# Patient Record
Sex: MALE | Race: WHITE
[De-identification: names, ages, dates, MRNs, and addresses within clinical notes are randomized per-mention and may not be internally consistent; named-entity substitution may affect disease eponyms.]

---

## 2019-04-10 ENCOUNTER — HOSPITAL ENCOUNTER (EMERGENCY)
Dept: HOSPITAL 10 - FTE | Age: 59
Discharge: HOME | End: 2019-04-10
Payer: COMMERCIAL

## 2019-04-10 ENCOUNTER — HOSPITAL ENCOUNTER (EMERGENCY)
Dept: HOSPITAL 91 - FTE | Age: 59
Discharge: HOME | End: 2019-04-10
Payer: COMMERCIAL

## 2019-04-10 VITALS
HEIGHT: 70 IN | WEIGHT: 216.49 LBS | HEIGHT: 70 IN | WEIGHT: 216.49 LBS | BODY MASS INDEX: 30.99 KG/M2 | BODY MASS INDEX: 30.99 KG/M2

## 2019-04-10 VITALS — SYSTOLIC BLOOD PRESSURE: 180 MMHG | RESPIRATION RATE: 18 BRPM | DIASTOLIC BLOOD PRESSURE: 108 MMHG | HEART RATE: 97 BPM

## 2019-04-10 DIAGNOSIS — L02.413: Primary | ICD-10-CM

## 2019-04-10 PROCEDURE — 96372 THER/PROPH/DIAG INJ SC/IM: CPT

## 2019-04-10 PROCEDURE — 99284 EMERGENCY DEPT VISIT MOD MDM: CPT

## 2019-04-10 RX ADMIN — ACETAMINOPHEN 1 MG: 500 TABLET, FILM COATED ORAL at 19:45

## 2019-04-10 RX ADMIN — CEFTRIAXONE SODIUM 1 GM: 1 INJECTION, POWDER, FOR SOLUTION INTRAMUSCULAR; INTRAVENOUS at 19:45

## 2019-04-10 RX ADMIN — IBUPROFEN 1 MG: 800 TABLET, FILM COATED ORAL at 19:45

## 2019-04-10 RX ADMIN — LIDOCAINE HYDROCHLORIDE 1 ML: 10 INJECTION, SOLUTION EPIDURAL; INFILTRATION; INTRACAUDAL; PERINEURAL at 20:02

## 2019-04-10 RX ADMIN — SULFAMETHOXAZOLE AND TRIMETHOPRIM 1 TAB: 800; 160 TABLET ORAL at 20:02

## 2019-04-10 NOTE — ERD
ER Documentation


Chief Complaint


Chief Complaint





abscess @ right upper arm





HPI


History of Present Illness: Patient coming in today with complaint of abscess to


right upper forearm that has been present for 4 days.  Patient reports he feels 


that abscess is getting better due to redness and induration decreasing.  Nichole willoughby reports he is only coming to ER because his wife insisted that he come in. 


Patient reports he wants to go to work tonight.  Patient denies associated 


symptoms including chills, fatigue.  Patient unsure if he has been bitten by an 


insect, but reports mild itching 4 nights ago and then noticing redness and 


swelling the next day.





At home pharmacological/nonpharmacological treatment for symptoms: Patient 


reports washing abscess at nighttime with alcohol and putting Neosporin on it. 


"It keeps she is scabbing over"








Denies social concerns; Denies recent foreign travel





ROS


All systems reviewed and are negative except as per history of present illness.





Medications


Home Meds


Active Scripts


Acetaminophen* (Tylophen*) 500 Mg Capsule, 2 CAP PO Q6 PRN for PAIN AND OR 


ELEVATED TEMP, #20 CAP


   Prov:MARIN SRINIVASAN NP         4/10/19


Sulfamethoxazole/Trimethoprim* (Bactrim Ds* Tablet) 1 Each Tablet, 1 TAB PO BID 


for abscess infection for 7 Days, TAB


   Prov:MARIN SRINIVASAN NP         4/10/19


Cephalexin* (Keflex*) 500 Mg Capsule, 500 MG PO QID for 7 Days, CAP


   Prov:MARIN SRINIVASAN NP         4/10/19


Ibuprofen* (Motrin*) 800 Mg Tab, 800 MG PO TID, #30 TAB


   Prov:JESSICA PENALOZA MD         8/30/15


Oxycodone Hcl-Acetaminophen* (Percocet*)  Mg Tablet, 1 TAB PO Q4H PRN for 


PAIN, #20 TAB


   Prov:JESSICA PENALOZA MD         8/30/15


Diazepam* (Diazepam*) 10 Mg Tablet, 10 MG PO Q8, #20 TAB


   Prov:JESSICA PENALOZA MD         8/30/15


Reported Medications


Ibuprofen* (Motrin*) 800 Mg Tab, 800 MG PO TID PRN for PAIN LEVEL 1-5, TAB


   8/30/15


Cyclobenzaprine Hcl* (Cyclobenzaprine Hcl*) 10 Mg Tablet, 10 MG PO TID PRN for M


USCLE SPASMS, TAB


   8/30/15





Allergies


Allergies:  


Coded Allergies:  


     No Known Drug Allergies (Verified  Allergy, Unknown, 4/10/19)





PMhx/Soc


Medical and Surgical Hx:  pt denies Medical Hx, pt denies Surgical Hx


Hx Miscellaneous Medical Probl:  Yes (SCIATICA )


Hx Alcohol Use:  Yes


Hx Substance Use:  No


Hx Tobacco Use:  No


Smoking Status:  Never smoker





FmHx


Family History:  diabetes; 


   No coronary disease





Physical Exam


Vitals





Vital Signs


  Date      Temp   Pulse  Resp  B/P (MAP)   Pulse Ox  O2         O2 Flow    FiO2


Time                                                  Delivery   Rate


   4/10/19   99.4     97    18     180/108        97  Room Air


     20:57                           (132)


   4/10/19  100.1    114    24     156/101        96


     18:21                           (119)





Physical Exam


Const:   No acute distress


Head:   Atraumatic 


Eyes:    Normal Conjunctiva


ENT:    Normal External Ears, Nose and Mouth.


Neck:               Full range of motion. No meningismus.


Resp:   Clear to auscultation bilaterally


Cardio:   Regular rate and rhythm, no murmurs


Abd:    Soft, non tender, non distended. Normal bowel sounds


Skin:   No petechiae.  Abscess noted to right upper arm, positive induration, no


fluctuance, mild area of purulent drainage noted, no streaking, no 


lymphadenopathy


Back:   No midline or flank tenderness


Ext:    No cyanosis, or edema


Neur:   Awake and alert


Psych:    Normal Mood and Affect


Results 24 hrs





Current Medications


 Medications
   Dose
          Sig/Clarissa
       Start Time
   Status  Last


 (Trade)       Ordered        Route
 PRN     Stop Time              Admin
Dose


                              Reason                                Admin


                1,000 mg       ONCE  STAT
    4/10/19       DC           4/10/19


Acetaminophen                 PO
            19:09
                       19:45




  (Tylenol                                  4/10/19 19:12


Tab)


 Ibuprofen
     800 mg         ONCE  ONCE
    4/10/19       DC           4/10/19


(Motrin)                      PO
            19:30
                       19:45



                                             4/10/19 19:31


 Ceftriaxone    1 gm           ONCE  ONCE
    4/10/19       DC           4/10/19


Sodium
                       IM
            19:30
                       19:45



(Rocephin)                                   4/10/19 19:31


 Lidocaine
     2.1 ml         ONCE  ONCE
    4/10/19       DC           4/10/19


(Xylocaine                    INFIL
         19:30
                       20:02



1%
  (Mpf))                                  4/10/19 19:31


                1 tab          ONCE  ONCE
    4/10/19       DC           4/10/19


Trimethoprim/                 PO
            20:00
                       20:02




                                            4/10/19 20:01


Sulfamethoxaz


ole



(Bactrim


(Ds))








Procedures/MDM


ED course includes a thorough examination and history. 


Medications: Acetaminophen and ibuprofen for fever; patient only reporting very 


mild pain; Rocephin antibiotic for infection due to abscess appearing to be 


causing systemic signs of infection.


Imaging: None


Labs: None





Low suspicion for life-threatening medical emergency.  Low suspicion for 


infectious emergency that requires hospitalization lash immediate intervention. 


No fluctuance noted to abscess, no incision and drainage indicated.





Otherwise healthy patient presenting with constellation of symptoms likely 


representing skin abscess as characterized by history, physical exam findings.  


No cultures done, we will empirically treat for MRSA.





ED course: Consultation with ED MD Dr. Rk Dr. to the bedside to reevaluate 


patient, agrees with plan of care, no fluctuance noting an abscess is mildly 


draining.  Small area of fibrin noted.  No  necrosis noted.  Will add Bactrim 


before discharge.





No respiratory distress, otherwise relatively well appearing and nontoxic. Pat


ient educated on diagnoses, prescriptions, follow-up care, return precautions.  


Strict return precautions given for worsening condition; questions answered 


discharge.





Disposition for discharge with followup in 2 days with PCP/clinic for 


reevaluation





Departure


Diagnosis:  


   Primary Impression:  


   Abscess of right upper extremity


Condition:  Stable


Patient Instructions:  Abscess, Antiobiotic Treatment Only


Referrals:  


[facility]


YOU HAVE RECEIVED A MEDICAL SCREENING EXAM AND THE RESULTS INDICATE THAT YOU DO 


NOT HAVE A CONDITION THAT REQUIRES URGENT TREATMENT IN THE EMERGENCY DEPARTMENT.





FURTHER EVALUATION AND TREATMENT OF YOUR CONDITION CAN WAIT UNTIL YOU ARE SEEN 


IN YOUR DOCTORS OFFICE WITHIN THE NEXT 1-2 DAYS. IT IS YOUR RESPONSIBILITY TO 


MAKE AN APPOINTMENT FOR FOLOW-UP CARE.





IF YOU HAVE A PRIMARY DOCTOR


--you should call your primary doctor and schedule an appointment





IF YOU DO NOT HAVE A PRIMARY DOCTOR YOU CAN CALL OUR PHYSICIAN REFERRAL HOTLINE 


AT


 [phone] 





IF YOU CAN NOT AFFORD TO SEE A PHYSICIAN YOU CAN CHOSE FROM THE FOLLOWING 


[facility]





[facility] [phone] [address]. [facility] [phone] [address]. [facility] [phone] [address] [facility] [phone] [address] [facility]. [facility] [phone] [address] [facility]. [facility] [phone] [address] [facility]. [facility]


YOU HAVE RECEIVED A MEDICAL SCREENING EXAM AND THE RESULTS INDICATE THAT YOU DO 


NOT HAVE A CONDITION THAT REQUIRES URGENT TREATMENT IN THE EMERGENCY DEPARTMENT.





FURTHER EVALUATION AND TREATMENT OF YOUR CONDITION CAN WAIT UNTIL YOU ARE SEEN 


IN YOUR DOCTORS OFFICE WITHIN THE NEXT 1-2 DAYS. IT IS YOUR RESPONSIBILITY TO 


MAKE AN APPOINTMENT FOR FOLOW-UP CARE.





IF YOU HAVE A PRIMARY DOCTOR


--you should call your primary doctor and schedule and appointment





IF YOU DO NOT HAVE A PRIMARY DOCTOR YOU CAN CALL OUR PHYSICIAN REFERRAL HOTLINE 


AT [phone].





IF YOU CAN NOT AFFORD TO SEE A PHYSICIAN YOU CAN CHOSE FROM THE FOLLOWING [facility]


INSTITUTIONS:





[facility]


[address] [place] [address]





[facility]


[address] [place] [address]





[facility]


[address] [place] [address]





Additional Instructions:  


Thank you very much for allowing us to participate in your care. 


Your health and safety is our top priority at [facility].  It 


is important to read all discharge instructions and education provided in your 


discharge packet.





Call your primary care doctor TOMORROW for an appointment during the next 2 days


 for a wound check/reassessment of abscess; bring all the information and 


medications prescribed. 





Have prescriptions filled and follow precisely the directions on the label.  


Cephalexin is an antibiotic; you must take this medication every 6 hours for 7 


days.  Bactrim is an antibiotic; this antibiotic will cover the MRSA bacteria.  


Acetaminophen is for feverpain.





If the symptoms get worse and your provider is unavailable, return to the 


Emergency Department immediately.











MARIN SRINIVASAN NP            Apr 10, 2019 21:25

## 2019-04-12 ENCOUNTER — HOSPITAL ENCOUNTER (EMERGENCY)
Dept: HOSPITAL 91 - FTE | Age: 59
Discharge: HOME | End: 2019-04-12
Payer: COMMERCIAL

## 2019-04-12 ENCOUNTER — HOSPITAL ENCOUNTER (EMERGENCY)
Dept: HOSPITAL 10 - FTE | Age: 59
Discharge: HOME | End: 2019-04-12
Payer: COMMERCIAL

## 2019-04-12 VITALS
WEIGHT: 229.28 LBS | BODY MASS INDEX: 32.82 KG/M2 | WEIGHT: 229.28 LBS | HEIGHT: 70 IN | HEIGHT: 70 IN | BODY MASS INDEX: 32.82 KG/M2

## 2019-04-12 VITALS — HEART RATE: 85 BPM | SYSTOLIC BLOOD PRESSURE: 171 MMHG | RESPIRATION RATE: 18 BRPM | DIASTOLIC BLOOD PRESSURE: 93 MMHG

## 2019-04-12 DIAGNOSIS — Z48.01: Primary | ICD-10-CM

## 2019-04-12 PROCEDURE — 99283 EMERGENCY DEPT VISIT LOW MDM: CPT

## 2019-04-12 NOTE — ERD
ER Documentation


Chief Complaint


Chief Complaint





pt bib self for wound check right upper arm





HPI


59-year-old male presents for recheck of an abscess in the right upper arm.  It 


is draining spontaneously and incision and drainage was not performed.  Is 


taking Bactrim and Keflex.  Patient states pain and redness has improved.  He 


denies fevers, additional symptoms.





ROS


All systems reviewed and are negative except as per history of present illness.





Medications


Home Meds


Active Scripts


Cephalexin* (Keflex*) 500 Mg Capsule, 500 MG PO QID for 5 Days, CAP


   Prov:RAY MARTINEZ MD         4/12/19


Sulfamethoxazole/Trimethoprim* (Bactrim Ds* Tablet) 1 Each Tablet, 1 TAB PO BID 


for 5 Days, #10 TAB


   Prov:RAY MARTINEZ MD         4/12/19


Acetaminophen* (Tylophen*) 500 Mg Capsule, 2 CAP PO Q6 PRN for PAIN AND OR 


ELEVATED TEMP, #20 CAP


   Prov:MARIN SRINIVASAN NP         4/10/19


Sulfamethoxazole/Trimethoprim* (Bactrim Ds* Tablet) 1 Each Tablet, 1 TAB PO BID 


for abscess infection for 7 Days, TAB


   Prov:MARIN SRINIVASAN NP         4/10/19


Cephalexin* (Keflex*) 500 Mg Capsule, 500 MG PO QID for 7 Days, CAP


   Prov:MARIN SRINIVASAN NP         4/10/19


Ibuprofen* (Motrin*) 800 Mg Tab, 800 MG PO TID, #30 TAB


   Prov:JESSICA PENALOZA MD         8/30/15


Oxycodone Hcl-Acetaminophen* (Percocet*)  Mg Tablet, 1 TAB PO Q4H PRN for 


PAIN, #20 TAB


   Prov:JESSICA PENALOZA MD         8/30/15


Diazepam* (Diazepam*) 10 Mg Tablet, 10 MG PO Q8, #20 TAB


   Prov:JESSICA PENALOZA MD         8/30/15


Reported Medications


Ibuprofen* (Motrin*) 800 Mg Tab, 800 MG PO TID PRN for PAIN LEVEL 1-5, TAB


   8/30/15


Cyclobenzaprine Hcl* (Cyclobenzaprine Hcl*) 10 Mg Tablet, 10 MG PO TID PRN for 


MUSCLE SPASMS, TAB


   8/30/15





Allergies


Allergies:  


Coded Allergies:  


     No Known Drug Allergies (Verified  Allergy, Unknown, 4/10/19)





PMhx/Soc


History of Surgery:  No


Anesthesia Reaction:  No


Hx Neurological Disorder:  No


Hx Respiratory Disorders:  No


Hx Cardiac Disorders:  No


Hx Psychiatric Problems:  No


Hx Miscellaneous Medical Probl:  Yes (SCIATICA )


Hx Alcohol Use:  No


Hx Substance Use:  No


Hx Tobacco Use:  No


Smoking Status:  Never smoker





FmHx


Family History:  No diabetes, No coronary disease, No other





Physical Exam


Vitals





Vital Signs


  Date      Temp  Pulse  Resp  B/P (MAP)   Pulse Ox  O2          O2 Flow    FiO2


Time                                                 Delivery    Rate


   4/12/19  98.3     85    18      171/93        98


     11:34                          (119)





Physical Exam


Const:   No acute distress


Head:   Atraumatic 


Eyes:    Normal Conjunctiva


ENT:    Normal External Ears, Nose and Mouth.


Neck:               Full range of motion. No meningismus.


Resp:   Clear to auscultation bilaterally


Cardio:   Regular rate and rhythm, no murmurs


Abd:    Soft, non tender, non distended. Normal bowel sounds


Skin:   No petechiae or rashes.  Healing abscess decreased from area marked on 


right upper arm.  There is active drainage and fibrin plug.  No significant 


induration, streaking, fluctuance.


Back:   No midline or flank tenderness


Ext:    No cyanosis, or edema


Neur:   Awake and alert


Psych:    Normal Mood and Affect





Procedures/MDM


Patient presents for recheck on her right upper extremity abscess which is 


spontaneously draining and appears to be improving with oral antibiotic therapy.


 He will be discharged home with continuation of antibiotics, return precautions


for worsening redness, fevers, new worsening symptoms.





Departure


Diagnosis:  


   Primary Impression:  


   Encounter for wound re-check


Condition:  Stable


Patient Instructions:  Wound Care


Referrals:  


DOCTOR,NOT ON STAFF (PCP)





Additional Instructions:  


Continue antibiotics.  Recheck for worsening redness, fevers, new worsening 


symptoms.  Wound appears to be improving.











RAY MARTINEZ MD             Apr 12, 2019 12:46